# Patient Record
Sex: MALE | Race: WHITE | NOT HISPANIC OR LATINO | URBAN - METROPOLITAN AREA
[De-identification: names, ages, dates, MRNs, and addresses within clinical notes are randomized per-mention and may not be internally consistent; named-entity substitution may affect disease eponyms.]

---

## 2024-04-29 ENCOUNTER — EMERGENCY (EMERGENCY)
Facility: HOSPITAL | Age: 19
LOS: 1 days | Discharge: HOME | End: 2024-04-29
Attending: STUDENT IN AN ORGANIZED HEALTH CARE EDUCATION/TRAINING PROGRAM
Payer: MEDICAID

## 2024-04-29 VITALS
RESPIRATION RATE: 18 BRPM | HEART RATE: 82 BPM | OXYGEN SATURATION: 100 % | SYSTOLIC BLOOD PRESSURE: 148 MMHG | DIASTOLIC BLOOD PRESSURE: 82 MMHG

## 2024-04-29 VITALS
OXYGEN SATURATION: 99 % | WEIGHT: 219.58 LBS | HEART RATE: 100 BPM | DIASTOLIC BLOOD PRESSURE: 95 MMHG | RESPIRATION RATE: 16 BRPM | HEIGHT: 68 IN | TEMPERATURE: 98 F | SYSTOLIC BLOOD PRESSURE: 151 MMHG

## 2024-04-29 PROCEDURE — 73140 X-RAY EXAM OF FINGER(S): CPT | Mod: 26,RT

## 2024-04-29 PROCEDURE — 99283 EMERGENCY DEPT VISIT LOW MDM: CPT | Mod: 25

## 2024-04-29 PROCEDURE — 90715 TDAP VACCINE 7 YRS/> IM: CPT

## 2024-04-29 PROCEDURE — 90471 IMMUNIZATION ADMIN: CPT

## 2024-04-29 PROCEDURE — 12001 RPR S/N/AX/GEN/TRNK 2.5CM/<: CPT

## 2024-04-29 PROCEDURE — 99284 EMERGENCY DEPT VISIT MOD MDM: CPT | Mod: 25,57

## 2024-04-29 PROCEDURE — 12001 RPR S/N/AX/GEN/TRNK 2.5CM/<: CPT | Mod: 59

## 2024-04-29 PROCEDURE — 73140 X-RAY EXAM OF FINGER(S): CPT

## 2024-04-29 PROCEDURE — 26750 TREAT FINGER FRACTURE EACH: CPT | Mod: 54,F7

## 2024-04-29 RX ORDER — CEPHALEXIN 500 MG
500 CAPSULE ORAL ONCE
Refills: 0 | Status: COMPLETED | OUTPATIENT
Start: 2024-04-29 | End: 2024-04-29

## 2024-04-29 RX ORDER — LIDOCAINE HCL 20 MG/ML
5 VIAL (ML) INJECTION ONCE
Refills: 0 | Status: COMPLETED | OUTPATIENT
Start: 2024-04-29 | End: 2024-04-29

## 2024-04-29 RX ORDER — IBUPROFEN 200 MG
1 TABLET ORAL
Qty: 20 | Refills: 0
Start: 2024-04-29 | End: 2024-05-03

## 2024-04-29 RX ORDER — CEPHALEXIN 500 MG
1 CAPSULE ORAL
Qty: 15 | Refills: 0
Start: 2024-04-29 | End: 2024-05-03

## 2024-04-29 RX ORDER — BACITRACIN ZINC 500 UNIT/G
1 OINTMENT IN PACKET (EA) TOPICAL ONCE
Refills: 0 | Status: COMPLETED | OUTPATIENT
Start: 2024-04-29 | End: 2024-04-29

## 2024-04-29 RX ORDER — TETANUS TOXOID, REDUCED DIPHTHERIA TOXOID AND ACELLULAR PERTUSSIS VACCINE, ADSORBED 5; 2.5; 8; 8; 2.5 [IU]/.5ML; [IU]/.5ML; UG/.5ML; UG/.5ML; UG/.5ML
0.5 SUSPENSION INTRAMUSCULAR ONCE
Refills: 0 | Status: COMPLETED | OUTPATIENT
Start: 2024-04-29 | End: 2024-04-29

## 2024-04-29 RX ADMIN — TETANUS TOXOID, REDUCED DIPHTHERIA TOXOID AND ACELLULAR PERTUSSIS VACCINE, ADSORBED 0.5 MILLILITER(S): 5; 2.5; 8; 8; 2.5 SUSPENSION INTRAMUSCULAR at 12:30

## 2024-04-29 RX ADMIN — Medication 1 APPLICATION(S): at 13:17

## 2024-04-29 RX ADMIN — Medication 500 MILLIGRAM(S): at 12:46

## 2024-04-29 RX ADMIN — Medication 5 MILLILITER(S): at 13:17

## 2024-04-29 NOTE — ED PROVIDER NOTE - CLINICAL SUMMARY MEDICAL DECISION MAKING FREE TEXT BOX
18-year-old male, presents for evaluation of right middle finger injury earlier today.  Plan for x-ray rule out fracture.  Patient will need nailbed repair and nail splint with outpatient hand surgery follow-up.  Will also update tetanus immunization. 18-year-old male, presents for evaluation of right middle finger injury earlier today.  Plan for x-ray rule out fracture.  Patient will need nailbed repair and nail splint with outpatient hand surgery follow-up.  Will also update tetanus immunization.    13: 38–x-ray shows tuft fracture.  Nailbed laceration repaired and nail splint applied and secured.  Xeroform dressing.  Tetanus immunization updated.  Will give Keflex Rx and prompt follow-up with hand surgeon within the next 3-4 days.  Discussed home care and red flags to come back to the hospital.  Will request assistance from care coordinator to to ensure prompt follow-up.

## 2024-04-29 NOTE — ED PROVIDER NOTE - PATIENT PORTAL LINK FT
You can access the FollowMyHealth Patient Portal offered by [facility] by registering at the following website: [email or web address]. By joining [facility]’s FollowMyHealth portal, you will also be able to view your health information using other applications (apps) compatible with our system.

## 2024-04-29 NOTE — ED PROVIDER NOTE - PHYSICAL EXAMINATION
Right middle finger exam: Full range of motion against resistance.  Complete nail avulsion with the transverse superficial nailbed laceration with bleeding controlled.  Superficial avulsion of the eponychium.  Localized tenderness to the distal phalanx.

## 2024-04-29 NOTE — ED PROVIDER NOTE - OBJECTIVE STATEMENT
18-year-old male, no significant past medical history, presents for evaluation of right middle finger injury earlier today.  Heavy object fell directly on his right middle finger.  It resulted in complete avulsion of the nail and laceration of the nailbed.  Currently reports localized pain and minimal bleeding.  Unknown last tetanus immunization.  Denies any numbness, tingling or focal weakness or any other complaints.

## 2024-04-29 NOTE — ED PROVIDER NOTE - CARE PLAN
1 Principal Discharge DX:	Nailbed laceration, finger, initial encounter  Secondary Diagnosis:	Nail avulsion, finger, initial encounter  Secondary Diagnosis:	Finger fracture, right

## 2024-04-29 NOTE — ED PROCEDURE NOTE - SKIN SUTURE
Two 5.0 Chromic Gut sutures to repair the nailbed laceration and 1 6.0 nylon suture for nail splint/interrupted

## 2024-04-29 NOTE — ED PROVIDER NOTE - NSFOLLOWUPINSTRUCTIONS_ED_ALL_ED_FT
Follow-up with a hand surgeon within 3-4 days.  Keep the splint and dressing on until you follow-up.  Take the medication as prescribed.    If you experience any new or worsening symptoms or if you are concerned you can always come back to the emergency for a re-evaluation.    Some results may not be available at the time of your discharge from the hospital. You can download the FOLLOW MY HEALTH moshe and have access to these results.  **Official radiology report by the radiologist will be available within 24 hours. If there is a discrepancy you will contacted.     If there were any prescriptions given to you during the visit today take them as prescribed. If you have any questions you can ask the pharmacist.

## 2024-04-29 NOTE — ED PROCEDURE NOTE - PROCEDURE ADDITIONAL DETAILS
the nailbed was thoroughly cleaned and repaired with chromic gut sutures.  Bacitracin ointment applied to the wound and artificial nail splint applied underneath the eponychium and secured with 1 6.0 nylon suture.  Xeroform dressing applied to the wound

## 2024-04-29 NOTE — ED PROCEDURE NOTE - CPROC ED TIME OUT STATEMENT1
“Patient's name, , procedure and correct site were confirmed during the [place] Timeout.”
“Patient's name, , procedure and correct site were confirmed during the [facility] Timeout.”

## 2024-04-29 NOTE — ED ADULT NURSE NOTE - NS ED NOTE  TALK SOMEONE YN
Pt able to ambulate to and from bathroom without assistance. Pt denies any dizziness, gait appears steady. No

## 2024-04-29 NOTE — ED PROVIDER NOTE - ATTENDING APP SHARED VISIT CONTRIBUTION OF CARE
I saw and evaluated the patient, and discussed the case with the NP.  I agree with their findings and plan as documented in their note in the patient’s medical record.  Disposition: Discharge. Discussed strict return precautions with patient.

## 2024-05-02 ENCOUNTER — NON-APPOINTMENT (OUTPATIENT)
Age: 19
End: 2024-05-02

## 2024-05-02 ENCOUNTER — APPOINTMENT (OUTPATIENT)
Dept: ORTHOPEDIC SURGERY | Facility: CLINIC | Age: 19
End: 2024-05-02
Payer: MEDICAID

## 2024-05-02 VITALS — WEIGHT: 218.25 LBS | BODY MASS INDEX: 32.33 KG/M2 | HEIGHT: 69 IN

## 2024-05-02 DIAGNOSIS — S67.10XA CRUSHING INJURY OF UNSPECIFIED FINGER(S), INITIAL ENCOUNTER: ICD-10-CM

## 2024-05-02 PROBLEM — Z00.00 ENCOUNTER FOR PREVENTIVE HEALTH EXAMINATION: Status: ACTIVE | Noted: 2024-05-02

## 2024-05-02 PROCEDURE — 99203 OFFICE O/P NEW LOW 30 MIN: CPT

## 2024-05-02 NOTE — HISTORY OF PRESENT ILLNESS
[de-identified] : Pt is a 17 y/o male with right long finger fracture.  A table fell on his right long finger.  He had pain immediately.  He also had a laceration.  He went to [facility] ED where xrays revealed a distal phalanx fracture.  Sutures were applied.  A dressing and splint was applied and he was advised to follow up with a specialist.

## 2024-05-02 NOTE — ADDENDUM
[FreeTextEntry1] :  I, Radha Banerjee wrote this note acting as a scribe for Dr. Kp Rivera on May 02, 2024.

## 2024-05-02 NOTE — END OF VISIT
[FreeTextEntry3] :  All medical record entries made by the Scribe were at my,  Dr. Kp Rivera MD., direction and personally dictated by me on 05/02/2024. I have personally reviewed the chart and agree that the record accurately reflects my personal performance of the history, physical exam, assessment and plan.

## 2024-05-02 NOTE — PHYSICAL EXAM
[de-identified] :  Patient is WDWN, alert, and in no acute distress. Breathing is unlabored. He is grossly oriented to person, place, and time.  Right Hand: (Long finger)  wound is present  tenderness is present edema is present limited ROM  sensation is intact

## 2024-05-02 NOTE — DISCUSSION/SUMMARY
[de-identified] : The underlying pathophysiology was reviewed with the patient. XR films were reviewed with the patient. Discussed at length the nature of the patient's condition.   Patient was advised to take OTC medications and topical analgesic for pain management.   Patient's hand was cleaned with Hydrogen Peroxide and the bandage was removed and cleaned he was advised to move the hand to bring back the ROM.  All questions answered, understanding verbalized. Patient in agreement with plan of care.   Patient was advised to follow up in 2 weeks for further evaluation.

## 2024-05-20 ENCOUNTER — APPOINTMENT (OUTPATIENT)
Dept: ORTHOPEDIC SURGERY | Facility: CLINIC | Age: 19
End: 2024-05-20